# Patient Record
Sex: FEMALE | Race: WHITE
[De-identification: names, ages, dates, MRNs, and addresses within clinical notes are randomized per-mention and may not be internally consistent; named-entity substitution may affect disease eponyms.]

---

## 2017-01-10 ENCOUNTER — HOSPITAL ENCOUNTER (EMERGENCY)
Dept: HOSPITAL 25 - UCCORT | Age: 72
Discharge: HOME | End: 2017-01-10
Payer: MEDICARE

## 2017-01-10 VITALS — SYSTOLIC BLOOD PRESSURE: 143 MMHG | DIASTOLIC BLOOD PRESSURE: 70 MMHG

## 2017-01-10 DIAGNOSIS — Z88.8: ICD-10-CM

## 2017-01-10 DIAGNOSIS — Z88.5: ICD-10-CM

## 2017-01-10 DIAGNOSIS — F17.210: ICD-10-CM

## 2017-01-10 DIAGNOSIS — I10: ICD-10-CM

## 2017-01-10 DIAGNOSIS — J32.9: Primary | ICD-10-CM

## 2017-01-10 DIAGNOSIS — Z88.0: ICD-10-CM

## 2017-01-10 PROCEDURE — G0463 HOSPITAL OUTPT CLINIC VISIT: HCPCS

## 2017-01-10 PROCEDURE — 99212 OFFICE O/P EST SF 10 MIN: CPT

## 2017-01-10 PROCEDURE — 71020: CPT

## 2017-01-10 NOTE — UC
Throat Pain/Nasal Daren HPI





- HPI Summary


HPI Summary: 





10 days hx of nasal congestion, cough, pnd, sinus pain and pressure 


no fever, no chills,


+ chest pressure worse with coughing 








- History of Current Complaint


Chief Complaint: UCRespiratory


Stated Complaint: COUGH,MOTA


Time Seen by Provider: 01/10/17 10:24


Hx Obtained From: Patient


Onset/Duration: Gradual Onset, Lasting Days - 10, Still Present


Severity: Moderate


Cough: Nonproductive


Associated Signs & Symptoms: Positive: Sinus Discomfort, Nasal Discharge.  

Negative: Dysphagia, Drooling, Wheezing, Hoarseness, Fever, Vomiting, Rash





- Allergies/Home Medications


Allergies/Adverse Reactions: 


 Allergies











Allergy/AdvReac Type Severity Reaction Status Date / Time


 


Ibuprofen [From Motrin] Allergy Intermediate Hives Verified 01/10/17 10:27


 


Iodine Allergy Intermediate Hives Verified 01/10/17 10:27


 


Latex Allergy Intermediate Hives Verified 01/10/17 10:27


 


Morphine Allergy Intermediate Hives Verified 01/10/17 10:27


 


Penicillins Allergy Intermediate Hives Verified 01/10/17 10:27











Home Medications: 


 Home Medications





Chlorhexadine Mouth Wash 1 dose .ROUTE DAILY 01/10/17 [History Confirmed 01/10/

17]


Lisinopril [Lisinopril 2.5 MG-] 1 tab PO DAILY 01/10/17 [History Confirmed 01/10

/17]











PMH/Surg Hx/FS Hx/Imm Hx


Cardiovascular History Of: Reports: Hypertension


   Denies: Pacemaker/ICD


Cancer History Of: 


   Denies: Breast Cancer





- Surgical History


Surgical History: Yes


Surgery Procedure, Year, and Place: HYSTERECTOMY; TONSILECTOMY & ADENOIDS, 

cholecystectomy; hiatal hernia, APPENDECTOMY





- Family History


Known Family History: Positive: Hypertension





- Social History


Alcohol Use: None


Substance Use Type: None


Smoking Status (MU): Light Every Day Tobacco Smoker


Type: Cigarettes


Amount Used/How Often: 1/3 PPD


Length of Time of Smoking/Using Tobacco: On and Off for 52 Years


Have You Smoked in the Last Year: Yes


Household Exposure Type: Cigarettes





- Immunization History


Most Recent Influenza Vaccination: September 2015





Review of Systems


Constitutional: Negative


Skin: Negative


Eyes: Negative


ENT: Nasal Discharge


Respiratory: Cough


Cardiovascular: Negative


Gastrointestinal: Negative


Genitourinary: Negative


All Other Systems Reviewed And Are Negative: Yes





Physical Exam


Triage Information Reviewed: Yes


Appearance: Well-Appearing, No Pain Distress


Vital Signs: 


 Initial Vital Signs











Temp  100 F   01/10/17 10:16


 


Pulse  102   01/10/17 10:16


 


BP  143/70   01/10/17 10:16


 


Pulse Ox  97   01/10/17 10:16











Vital Signs Reviewed: Yes


Eyes: Positive: Conjunctiva Clear


ENT: Positive: Normal ENT inspection, Hearing grossly normal, Pharyngeal 

erythema, Nasal congestion, Nasal drainage, TMs normal


Neck: Positive: Supple, Nontender, No Lymphadenopathy


Respiratory: Positive: Chest non-tender, Lungs clear, Normal breath sounds


Cardiovascular: Positive: RRR, No Murmur, Pulses Normal


Abdominal Exam: Normal


Abdomen Description: Positive: Soft


Bowel Sounds: Positive: Present





Throat Pain/Nasal Course/Dx





- Differential Dx/Diagnosis


Provider Diagnoses: sinusitis





Discharge





- Discharge Plan


Condition: Stable


Disposition: HOME


Prescriptions: 


DOXYcycline CAP(*) [DOXYcycline 100MG CAP(*)] 100 mg PO BID #20 cap


Patient Education Materials:  Sinusitis (ED)


Referrals: 


Viet Villarreal MD [Primary Care Provider] - 7 Days

## 2017-01-10 NOTE — RAD
Indication: Cough.



2 views of the chest including dual energy PA views demonstrate no mediastinal shift.

Heart is of normal size and configuration. Lung fields demonstrate no pleural fluid,

pneumonia or pneumothorax.



When compared to previous exam of 1/9/2010 no significant change is noted.



IMPRESSION: No active cardiopulmonary disease is noted.

## 2017-04-14 ENCOUNTER — HOSPITAL ENCOUNTER (EMERGENCY)
Dept: HOSPITAL 25 - UCCORT | Age: 72
Discharge: HOME | End: 2017-04-14
Payer: MEDICARE

## 2017-04-14 VITALS — DIASTOLIC BLOOD PRESSURE: 82 MMHG | SYSTOLIC BLOOD PRESSURE: 139 MMHG

## 2017-04-14 DIAGNOSIS — Z88.6: ICD-10-CM

## 2017-04-14 DIAGNOSIS — Z79.01: ICD-10-CM

## 2017-04-14 DIAGNOSIS — K21.9: ICD-10-CM

## 2017-04-14 DIAGNOSIS — Z88.0: ICD-10-CM

## 2017-04-14 DIAGNOSIS — Z88.1: ICD-10-CM

## 2017-04-14 DIAGNOSIS — Z88.5: ICD-10-CM

## 2017-04-14 DIAGNOSIS — F17.210: ICD-10-CM

## 2017-04-14 DIAGNOSIS — Z86.2: ICD-10-CM

## 2017-04-14 DIAGNOSIS — E78.5: ICD-10-CM

## 2017-04-14 DIAGNOSIS — J06.9: Primary | ICD-10-CM

## 2017-04-14 DIAGNOSIS — I10: ICD-10-CM

## 2017-04-14 DIAGNOSIS — Z86.73: ICD-10-CM

## 2017-04-14 PROCEDURE — G0463 HOSPITAL OUTPT CLINIC VISIT: HCPCS

## 2017-04-14 PROCEDURE — 99212 OFFICE O/P EST SF 10 MIN: CPT

## 2017-04-14 NOTE — UC
Respiratory Complaint HPI





- HPI Summary


HPI Summary: 





The patient comes in today for:





1.  Rhinitis, cough, shoulder pain, hot and cold flashes:





Onset: 2 weeks.


Palliative/provocative: OTC cold preparation helps. 


Quality: Ache


Region: Shoulder (scapula)


Severity: No pain at this time.


Time: Constant.


Associated symptoms:


   Cough:  Productive of cloudy material.


   Rhinitis:  Clear to cloudy. 


   Temperature: Not take at home. 


   Right ear pain: Present. 








*





- History of Current Complaint


Chief Complaint: UCRespiratory


Stated Complaint: COLD SYMPTOMS


Time Seen by Provider: 04/14/17 14:53


Hx Obtained From: Patient





- Allergies/Home Medications


Allergies/Adverse Reactions: 


 Allergies











Allergy/AdvReac Type Severity Reaction Status Date / Time


 


Ibuprofen [From Motrin] Allergy Intermediate Hives Verified 01/10/17 10:27


 


Iodine Allergy Intermediate Hives Verified 01/10/17 10:27


 


Latex Allergy Intermediate Hives Verified 01/10/17 10:27


 


Morphine Allergy Intermediate Hives Verified 01/10/17 10:27


 


Penicillins Allergy Intermediate Hives Verified 01/10/17 10:27


 


Albuterol Allergy  Unknown Verified 04/14/17 14:48





   Reaction  





   Details  


 


Aspirin Allergy  Unknown Verified 04/14/17 14:48





   Reaction  





   Details  


 


Cephalosporins Allergy  Nausea And Verified 04/14/17 14:48





   Vomiting  


 


Clindamycin Allergy  Unknown Verified 04/14/17 14:48





   Reaction  





   Details  


 


Erythromycin Allergy  Unknown Verified 04/14/17 15:04





   Reaction  





   Details  


 


Hydrocodone Allergy  Hives Verified 04/14/17 14:48


 


Levofloxacin [From Levaquin] Allergy  Unknown Verified 04/14/17 14:48





   Reaction  





   Details  


 


NSAIDs Allergy  Unknown Verified 04/14/17 14:48





   Reaction  





   Details  


 


Pravastatin Allergy  Unknown Verified 04/14/17 14:48





   Reaction  





   Details  


 


Pseudoephedrine Allergy  Unknown Verified 04/14/17 14:48





   Reaction  





   Details  


 


Tetracycline Allergy  Unknown Verified 04/14/17 14:48





   Reaction  





   Details  











Home Medications: 


 Home Medications





Phenylephrine-Dm-GG W/ APAP [Tylenol Cold & Flu Severe 5--325 mg] 1 tab 

PO Q6H PRN 04/14/17 [History Confirmed 04/14/17]











PMH/Surg Hx/FS Hx/Imm Hx


Previously Healthy: No - Coumadin, vertigo, thrombocytopenia, antiphospholipid 

antb syndrome, 


Endocrine History Of: Reports: Dyslipidemia


   Denies: Diabetes, Thyroid Disease, Hyperthyroidism, Hypothyroidism


Cardiovascular History Of: Reports: Hypertension


   Denies: Cardiac Disorders, Pacemaker/ICD, Myocardial Infarction, Congestive 

Heart Failure, Atrial Fibrillation, Deep Vein Thrombosis, Bleeding Disorders


Respiratory History Of: 


   Denies: COPD, Asthma, Bronchitis, Pneumonia, Pulmonary Embolism


GI/ History Of: Reports: Gastroesophageal Reflux


   Denies: Ulcer, Gastrointestinal Bleed, Gall Bladder Disease, Kidney Stones, 

Diverticulitis, Renal Disease, Urosepsis


Neurological History Of: Reports: CVA - "Mini stroke" 4-5 years ago.


   Denies: TIA, Dementia, Seizures, Migraine


Psychological History Of: 


   Denies: Anxiety, Depression, Bipolar Disorder, Schizophrenia, Post Traumatic 

Stress Disorder


Cancer History Of: 


   Denies: Lung Cancer, Colorectal Cancer, Breast Cancer, Prostate Cancer, 

Cervical Cancer


Other History Of: Anticoagulant Therapy - On coumadin


   Negative For: HIV, Hepatitis B, Hepatitis C





- Surgical History


Surgical History: Yes


Surgery Procedure, Year, and Place: HYSTERECTOMY; TONSILECTOMY & ADENOIDS, 

cholecystectomy; hiatal hernia, APPENDECTOMY





- Family History


Known Family History: Positive: Hypertension


   Negative: Cardiac Disease





- Social History


Occupation: Retired


Alcohol Use: None


Substance Use Type: None


Smoking Status (MU): Light Every Day Tobacco Smoker


Type: Cigarettes


Amount Used/How Often: 1/3 PPD


Length of Time of Smoking/Using Tobacco: On and Off for 52 Years


Have You Smoked in the Last Year: Yes


Household Exposure Type: Cigarettes





- Immunization History


Most Recent Influenza Vaccination: sept 2016





Review of Systems


Constitutional: Negative


Skin: Negative


Eyes: Negative


ENT: Negative, Ear Ache


Respiratory: Cough


Cardiovascular: Negative


Gastrointestinal: Negative


Genitourinary: Negative


All Other Systems Reviewed And Are Negative: Yes





Physical Exam


Triage Information Reviewed: Yes


Appearance: Well-Appearing, No Pain Distress, Well-Nourished


Vital Signs: 


 Initial Vital Signs











Temp  98.8 F   04/14/17 14:39


 


Pulse  78   04/14/17 14:39


 


Resp  17   04/14/17 14:39


 


BP  139/82   04/14/17 14:39


 


Pulse Ox  99   04/14/17 14:39











Vital Signs Reviewed: Yes


Eyes: Positive: Conjunctiva Clear.  Negative: Discharge


ENT: Positive: Hearing grossly normal.  Negative: Pharyngeal erythema, Nasal 

congestion, TM bulging, TM dull, TM red, Tonsillar swelling, Tonsillar exudate


Dental: Negative: Gross Decay/Caries @, Dental Fracture @


Neck: Positive: Supple, Nontender, No Lymphadenopathy.  Negative: Nuchal 

Rigidity


Respiratory: Positive: Chest non-tender, No respiratory distress, No accessory 

muscle use.  Negative: Crackles, Wheezing


Cardiovascular: Positive: RRR, No Murmur


Abdomen Description: Positive: Nontender, No Organomegaly, Soft.  Negative: 

Distended, Guarding


Musculoskeletal: Positive: Strength Intact, ROM Intact


Neurological: Positive: Alert, Muscle Tone Normal


Psychological: Positive: Age Appropriate Behavior, Consolable


Skin: Negative: rashes, breakdown





UC Diagnostic Evaluation





- Laboratory


O2 Sat by Pulse Oximetry: 99





Respiratory Course/Dx





- Course


Course Of Treatment: Patient was told that her symptoms and exam today suggest 

a viral infection.  However, she states that she wanted an antibiotic if she 

got worse (had persistent sinus pressure, and purulent rhinitis/cough).





- Differential Dx/Diagnosis


Differential Diagnosis/HQI/PQRI: Bronchitis, Sinusitis


Provider Diagnoses: Upper respiratory infection.





Discharge





- Discharge Plan


Condition: Stable


Disposition: HOME


Patient Education Materials:  Upper Respiratory Infection (ED)


Referrals: 


Viet Villarreal MD [Primary Care Provider] - 1 Week

## 2018-09-15 ENCOUNTER — HOSPITAL ENCOUNTER (EMERGENCY)
Dept: HOSPITAL 25 - UCCORT | Age: 73
Discharge: HOME | End: 2018-09-15
Payer: MEDICARE

## 2018-09-15 VITALS — SYSTOLIC BLOOD PRESSURE: 149 MMHG | DIASTOLIC BLOOD PRESSURE: 72 MMHG

## 2018-09-15 DIAGNOSIS — S00.262A: Primary | ICD-10-CM

## 2018-09-15 DIAGNOSIS — I10: ICD-10-CM

## 2018-09-15 DIAGNOSIS — Z88.0: ICD-10-CM

## 2018-09-15 DIAGNOSIS — Z88.6: ICD-10-CM

## 2018-09-15 DIAGNOSIS — Z79.01: ICD-10-CM

## 2018-09-15 DIAGNOSIS — E78.5: ICD-10-CM

## 2018-09-15 DIAGNOSIS — F17.210: ICD-10-CM

## 2018-09-15 DIAGNOSIS — Z88.5: ICD-10-CM

## 2018-09-15 DIAGNOSIS — Z88.1: ICD-10-CM

## 2018-09-15 DIAGNOSIS — R53.83: ICD-10-CM

## 2018-09-15 DIAGNOSIS — Z88.8: ICD-10-CM

## 2018-09-15 PROCEDURE — 71046 X-RAY EXAM CHEST 2 VIEWS: CPT

## 2018-09-15 PROCEDURE — 93005 ELECTROCARDIOGRAM TRACING: CPT

## 2018-09-15 PROCEDURE — 81003 URINALYSIS AUTO W/O SCOPE: CPT

## 2018-09-15 PROCEDURE — 87086 URINE CULTURE/COLONY COUNT: CPT

## 2018-09-15 PROCEDURE — 99212 OFFICE O/P EST SF 10 MIN: CPT

## 2018-09-15 PROCEDURE — G0463 HOSPITAL OUTPT CLINIC VISIT: HCPCS

## 2018-09-15 NOTE — RAD
INDICATION:  Wheeze right upper lobe, cough, smoker and fatigue.



COMPARISON:  Comparison is made with prior chest x-ray study from January 10, 2017.



TECHNIQUE: Dual-energy PA  and lateral views of the chest were obtained.



FINDINGS:   The heart is within normal limits in size. Mediastinal and hilar contours

appear within normal limits.



The lungs are hyperinflated and clear. No pleural effusion is seen. 



IMPRESSION:  FINDINGS SUGGESTIVE OF COPD, NO EVIDENCE FOR ACUTE FINDING.

## 2018-09-15 NOTE — UC
Eye Complaint HPI





- HPI Summary


HPI Summary: 


patient to urgent care today with c/o fatigue for a few weeks. Also c/o insect 

bite and itching to left upper eye lid 








- History of Current Complaint


Chief Complaint: UCGeneralIllness


Stated Complaint: ALLERGIC REACTION


Time Seen by Provider: 09/15/18 14:47


Hx Obtained From: Patient


Pregnant?: No


Onset/Duration: Sudden Onset - insect bite to left eye lid, Gradual Onset - 

fatigue


Timing: Constant


Pain Intensity: 0


Location of Injury: Eye Lid (upper) - insect bite


Aggravating Factor(s): Nothing


Alleviating Factor(s): Nothing


Associated Signs And Symptoms: Positive: Swelling - left upper eye lid





- Allergies/Home Medications


Allergies/Adverse Reactions: 


 Allergies











Allergy/AdvReac Type Severity Reaction Status Date / Time


 


albuterol Allergy Intermediate Hives Verified 06/15/18 14:06


 


hydrocodone Allergy Intermediate Hives Verified 06/15/18 14:11


 


ibuprofen Allergy Intermediate Hives Verified 06/15/18 14:04


 


iodine Allergy Intermediate Hives Verified 06/15/18 14:04


 


Latex, Natural Rubber Allergy Intermediate Hives Verified 06/15/18 14:04


 


morphine Allergy Intermediate Hives Verified 06/15/18 14:04


 


Penicillins Allergy Intermediate Hives Verified 06/15/18 14:04


 


aspirin Allergy Unknown Unknown Verified 06/15/18 14:08





   Reaction  





   Details  


 


clindamycin Allergy Unknown Unknown Verified 06/15/18 14:09





   Reaction  





   Details  


 


erythromycin base Allergy Unknown Unknown Verified 06/15/18 14:11





   Reaction  





   Details  


 


levofloxacin Allergy Unknown Unknown Verified 06/15/18 14:11





   Reaction  





   Details  


 


pravastatin Allergy Unknown Unknown Verified 06/15/18 14:08





   Reaction  





   Details  


 


pseudoephedrine Allergy Unknown Unknown Verified 06/15/18 14:11





   Reaction  





   Details  


 


tetracycline Allergy Unknown Unknown Verified 06/15/18 14:08





   Reaction  





   Details  


 


amitriptyline Allergy  Insomnia Verified 09/15/18 12:46


 


ascorbic acid Allergy  Unknown Verified 09/15/18 12:46





   Reaction  





   Details  


 


atropine [From Donnatal] Allergy  "I have no Verified 09/15/18 12:46





   idea."  


 


ciprofloxacin Allergy  "Oral Rash" Verified 09/15/18 12:46


 


doxapram Allergy  Unknown Verified 09/15/18 12:46





   Reaction  





   Details  


 


egg Allergy  "I don't Verified 09/15/18 12:46





   know. I  





   was  





   suprised  





   when I saw  





   that  





   there."  


 


hydrochlorothiazide Allergy  Unknown Verified 09/15/18 12:46





   Reaction  





   Details  


 


hyoscyamine [From Donnatal] Allergy  "I have no Verified 09/15/18 12:46





   idea."  


 


metronidazole [From Flagyl] Allergy  "I have no Verified 09/15/18 12:46





   idea.  





   That's  





   been over  





   the years."  


 


NSAIDS (Non-Steroidal Allergy  "I have no Verified 09/15/18 12:46





Anti-Inflamma   idea."  


 


phenobarbital [From Donnatal] Allergy  "I have no Verified 09/15/18 12:46





   idea."  


 


Quinolones Allergy  Unknown Verified 09/15/18 12:46





   Reaction  





   Details  


 


scopolamine [From Donnatal] Allergy  "I have no Verified 09/15/18 12:46





   idea."  


 


Cephalosporins AdvReac Intermediate nausea and Verified 06/15/18 14:08





   vomitting  


 


prednisone AdvReac  Insomnia Verified 09/15/18 12:46











Home Medications: 


 Home Medications





Atorvastatin* [Lipitor*] 20 mg PO DAILY 09/15/18 [History Confirmed 09/15/18]


Bifidobacterium Infantis [Align] 4 mg PO DAILY 09/15/18 [History Confirmed 09/15

/18]


LevoCETirizine TAB (NF) [Xyzal TAB (NF)] 5 mg PO DAILY 09/15/18 [History 

Confirmed 09/15/18]


Lisinopril/HCTZ 20/12.5(NF) [Zestoretic 20/12.5(NF)] 1 tab PO DAILY 09/15/18 [

History Confirmed 09/15/18]


Simethicone [Gas-X] 125 mg PO DAILY 09/15/18 [History Confirmed 09/15/18]


Warfarin TAB(*) [Coumadin TAB(*)] 2 mg PO TU 09/15/18 [History Confirmed 09/15/

18]


Warfarin TAB(*) [Coumadin TAB(*)] 3 mg PO SUMOWETHFRSA 09/15/18 [History 

Confirmed 09/15/18]











PMH/Surg Hx/FS Hx/Imm Hx


Previously Healthy: No


Endocrine History: Dyslipidemia


Cardiovascular History: Hypertension


Neurological History: TIA


Other History Of: Anticoagulant Therapy - On coumadin


   Negative For: HIV, Hepatitis B, Hepatitis C





- Surgical History


Surgical History: Yes


Surgery Procedure, Year, and Place: HYSTERECTOMY; TONSILECTOMY & ADENOIDS, 

cholecystectomy; hiatal hernia, APPENDECTOMY





- Family History


Known Family History: Positive: Hypertension


   Negative: Cardiac Disease





- Social History


Occupation: Retired


Lives: With Family


Alcohol Use: None


Substance Use Type: None


Smoking Status (MU): Light Every Day Tobacco Smoker


Type: Cigarettes


Amount Used/How Often: 1/3 PPD


Length of Time of Smoking/Using Tobacco: On and Off Since Age 15


Have You Smoked in the Last Year: Yes


Household Exposure Type: Cigarettes


Cessation Counseling: Counseled 3+Min - 10 Min





- Immunization History


Most Recent Influenza Vaccination: 2016


Most Recent Tetanus Shot: unknown





Review of Systems


Constitutional: Negative


Skin: Negative


Eyes: Negative, Other - itchy left upper eye lid with apparent insect bite


ENT: Negative


Respiratory: Negative


Cardiovascular: Negative


Gastrointestinal: Negative


Genitourinary: Negative


Motor: Negative


Neurovascular: Negative


Musculoskeletal: Negative


Neurological: Negative


Psychological: Negative


Is Patient Immunocompromised?: No


All Other Systems Reviewed And Are Negative: Yes





Physical Exam


Triage Information Reviewed: Yes


Appearance: Well-Appearing, No Pain Distress, Well-Nourished


Vital Signs: 


 Initial Vital Signs











Temp  98.5 F   09/15/18 12:37


 


Pulse  78   09/15/18 12:37


 


Resp  16   09/15/18 12:37


 


BP  149/72   09/15/18 12:37


 


Pulse Ox  95   09/15/18 12:37











Vital Signs Reviewed: Yes


Eye Exam: Normal


Eyes: Positive: Conjunctiva Clear


ENT Exam: Normal


ENT: Positive: Normal ENT inspection, Hearing grossly normal.  Negative: 

Pharynx normal, Nasal congestion, Trismus, Muffled voice, Hoarse voice


Dental Exam: Normal


Neck exam: Normal


Neck: Positive: Supple, Nontender


Respiratory Exam: Normal


Respiratory: Positive: Chest non-tender, Lungs clear, Normal breath sounds, No 

respiratory distress, No accessory muscle use


Cardiovascular Exam: Normal


Cardiovascular: Positive: RRR, No Murmur, Pulses Normal, Brisk Capillary Refill


Musculoskeletal Exam: Normal


Musculoskeletal: Positive: Strength Intact, ROM Intact


Neurological Exam: Normal


Neurological: Positive: Alert, Muscle Tone Normal


Psychological Exam: Normal


Skin Exam: Normal


Skin: Positive: Other - swelling left upper eye lid with redness and itching 

near apparent insect bite





Diagnostics





- Radiology


  ** No standard instances


Xray Interpretation: No Acute Changes


Radiology Interpretation Completed By: ED Physician, Radiologist - Patient Name

:         INOCENTE CLARKE                                                     

            Medical Record#: [id number] Ordering Physician: Christine Ascencio NP 

                                                                Acct.#: 

[id number] :     1945         Age: 72   Sex: F                      

                                     Location: [facility] [place] Exam Date

: 09/15/18 1458                                                                

               ADM Status: REG ER Order Information:                         

CHEST PA & LAT 2 VWS Accession Number:                          [id number] CPT

:                                       [id number] INDICATION:  Wheeze right upper 

lobe, cough, smoker and fatigue.  COMPARISON:  Comparison is made with prior 

chest x-ray study from January 10, 2017.  TECHNIQUE: Dual-energy PA  and 

lateral views of the chest were obtained.  FINDINGS:   The heart is within 

normal limits in size. Mediastinal and hilar contours appear within normal 

limits.  The lungs are hyperinflated and clear. No pleural effusion is seen.   

IMPRESSION:  FINDINGS SUGGESTIVE OF COPD, NO EVIDENCE FOR ACUTE FINDING.  ______

______________________________________________________ <Electronically signed 

by Uche Mcconnell MD in [place]>  09/15/18 1534 Dictated By: Uche Mcconnell MD 

Dictated Date/Time: 09/15/18 1534 Transcribed Date/Time: 09/15/18 1532 Copy to:

    CC:Yolie Valerio MD; Viet Villarreal MD; Christine Ascencio NP Imaging - [facility] 

                                Imaging [facility]  [address]              

                         [address] [place] [address] ph ([phone])                                     ph ([phone])                                                ph ([phone])         

          This report is only to be considered final once signed by the Provider

(s) as displayed in the "<Electronically Signed by >" field (s). Absence of a  

signature indicates the report is in a draft status and still needs to be 

finalized. In the event this document was created by someone other than the  

signing Provider, the individual initiating the document will be listed in the 

"Entered by:" or "Dictated by:" fields.                                        

                          1 of 1





- EKG


Cardiac Rate: NL


Cardiac Rhythm: Sinus: New - inverted and up right p waves


Ectopy: None


ST Segment: Normal


EKG Comparison: No Significant Change





Eye Complaint Course/Dx





- Course


Course Of Treatment: cool compress for insect bite, follow with pcp in 2-3 days-

-continue xyzal





- Differential Dx/Diagnosis


Provider Diagnoses: insect bite left upper eye lid, fatigue, nicotine dependant

, hypertension in poor control





Discharge





- Sign-Out/Discharge


Documenting (check all that apply): Patient Departure


All imaging exams completed and their final reports reviewed: Yes





- Discharge Plan


Condition: Stable


Disposition: HOME


Patient Education Materials:  How to Stop Smoking (ED), COPD (Chronic 

Obstructive Pulmonary Disease) (ED), Hypertension (ED), Cold Compress or Soak (

ED)


Referrals: 


Viet Villarreal MD [Primary Care Provider] - 2 Days





- Billing Disposition and Condition


Condition: STABLE


Disposition: Home